# Patient Record
Sex: FEMALE | Race: WHITE | NOT HISPANIC OR LATINO | URBAN - NONMETROPOLITAN AREA
[De-identification: names, ages, dates, MRNs, and addresses within clinical notes are randomized per-mention and may not be internally consistent; named-entity substitution may affect disease eponyms.]

---

## 2019-08-19 ENCOUNTER — OFFICE VISIT (OUTPATIENT)
Dept: URBAN - NONMETROPOLITAN AREA CLINIC 18 | Facility: CLINIC | Age: 59
End: 2019-08-19
Payer: COMMERCIAL

## 2019-08-19 PROCEDURE — 92014 COMPRE OPH EXAM EST PT 1/>: CPT | Performed by: OPTOMETRIST

## 2019-08-19 ASSESSMENT — VISUAL ACUITY
OD: 20/20
OS: 20/20

## 2019-08-19 ASSESSMENT — INTRAOCULAR PRESSURE
OS: 22
OD: 22

## 2019-08-19 NOTE — IMPRESSION/PLAN
Impression: Ocular hypertension, bilateral: H40.053. Plan: Ed pt on potential for glaucoma and risk of vision loss if left undetected. Pt reports family hx of glaucoma related blindness. VF 24-2 clear OU. OCT ON OD small inf RNFL defect and no thinning OS. Thick Pachy OU. No treatment needed at this time.  Will continue

## 2019-08-22 ENCOUNTER — TESTING ONLY (OUTPATIENT)
Dept: URBAN - NONMETROPOLITAN AREA CLINIC 18 | Facility: CLINIC | Age: 59
End: 2019-08-22
Payer: MEDICAID

## 2019-08-22 PROCEDURE — 76514 ECHO EXAM OF EYE THICKNESS: CPT | Performed by: OPTOMETRIST

## 2019-08-22 PROCEDURE — 92083 EXTENDED VISUAL FIELD XM: CPT | Performed by: OPTOMETRIST

## 2019-08-22 PROCEDURE — 92133 CPTRZD OPH DX IMG PST SGM ON: CPT | Performed by: OPTOMETRIST

## 2019-09-23 ENCOUNTER — OFFICE VISIT (OUTPATIENT)
Dept: URBAN - NONMETROPOLITAN AREA CLINIC 18 | Facility: CLINIC | Age: 59
End: 2019-09-23
Payer: MEDICAID

## 2019-09-23 PROCEDURE — 99213 OFFICE O/P EST LOW 20 MIN: CPT | Performed by: OPTOMETRIST

## 2019-09-23 ASSESSMENT — INTRAOCULAR PRESSURE
OS: 23
OD: 23

## 2019-09-23 NOTE — IMPRESSION/PLAN
Impression: Ocular hypertension, bilateral: H40.053. Plan: Ed pt on potential for glaucoma and risk of vision loss if left undetected. Pt reports family hx of glaucoma related blindness. VF 24-2 clear OU. OCT ON OD: small inf RNFL defect; OS: no thinning. Thick Pachy OU. No treatment needed at this time.  Will continue to monitor yearly with repeat OCT ON and complete exam.

## 2020-09-28 ENCOUNTER — OFFICE VISIT (OUTPATIENT)
Dept: URBAN - NONMETROPOLITAN AREA CLINIC 18 | Facility: CLINIC | Age: 60
End: 2020-09-28
Payer: MEDICAID

## 2020-09-28 DIAGNOSIS — H02.831 DERMATOCHALASIS OF RIGHT UPPER EYELID: ICD-10-CM

## 2020-09-28 DIAGNOSIS — H52.223 REGULAR ASTIGMATISM, BILATERAL: ICD-10-CM

## 2020-09-28 PROCEDURE — 99214 OFFICE O/P EST MOD 30 MIN: CPT | Performed by: OPTOMETRIST

## 2020-09-28 PROCEDURE — 92133 CPTRZD OPH DX IMG PST SGM ON: CPT | Performed by: OPTOMETRIST

## 2020-09-28 ASSESSMENT — INTRAOCULAR PRESSURE
OD: 20
OS: 20

## 2020-09-28 NOTE — IMPRESSION/PLAN
Impression: Ocular hypertension, bilateral: H40.053. Plan: Ed pt on potential for glaucoma and risk of vision loss if left undetected. Pt reports family hx of glaucoma related blindness. VF 24-2 clear OU. OCT ON OD: small inf RNFL defect, stable; OS: no thinning, stable. Thick Pachy OU. No treatment needed at this time.  Will continue to monitor yearly with repeat OCT ON and complete exam.

## 2020-10-26 ENCOUNTER — OFFICE VISIT (OUTPATIENT)
Dept: URBAN - NONMETROPOLITAN AREA CLINIC 18 | Facility: CLINIC | Age: 60
End: 2020-10-26
Payer: COMMERCIAL

## 2020-10-26 DIAGNOSIS — H25.13 AGE-RELATED NUCLEAR CATARACT, BILATERAL: ICD-10-CM

## 2020-10-26 DIAGNOSIS — H04.123 DRY EYE SYNDROME OF BILATERAL LACRIMAL GLANDS: Chronic | ICD-10-CM

## 2020-10-26 DIAGNOSIS — H40.053 OCULAR HYPERTENSION, BILATERAL: Chronic | ICD-10-CM

## 2020-10-26 PROCEDURE — 92014 COMPRE OPH EXAM EST PT 1/>: CPT | Performed by: OPTOMETRIST

## 2020-10-26 ASSESSMENT — INTRAOCULAR PRESSURE
OS: 19
OD: 19

## 2020-10-26 ASSESSMENT — VISUAL ACUITY
OD: 20/20
OS: 20/20

## 2020-10-26 NOTE — IMPRESSION/PLAN
Impression: Ocular hypertension, bilateral: H40.053. Plan: Ed pt on potential for glaucoma and risk of vision loss if left undetected. Pt reports family hx of glaucoma related blindness. H/O VF 24-2 clear OU. H/O OCT ON OD: small inf RNFL defect, stable; OS: no thinning, stable. Thick Pachy OU. No treatment needed at this time.  Will continue to monitor yearly with repeat OCT ON and complete exam.

## 2021-11-01 ENCOUNTER — OFFICE VISIT (OUTPATIENT)
Dept: URBAN - NONMETROPOLITAN AREA CLINIC 18 | Facility: CLINIC | Age: 61
End: 2021-11-01
Payer: MEDICAID

## 2021-11-01 DIAGNOSIS — H25.813 COMBINED FORMS OF AGE-RELATED CATARACT, BILATERAL: Primary | ICD-10-CM

## 2021-11-01 DIAGNOSIS — H02.834 DERMATOCHALASIS OF LEFT UPPER EYELID: ICD-10-CM

## 2021-11-01 PROCEDURE — 99214 OFFICE O/P EST MOD 30 MIN: CPT | Performed by: OPTOMETRIST

## 2021-11-01 ASSESSMENT — VISUAL ACUITY
OS: 20/30
OD: 20/30

## 2021-11-01 ASSESSMENT — INTRAOCULAR PRESSURE
OD: 22
OS: 20

## 2021-11-01 NOTE — IMPRESSION/PLAN
Impression: Combined forms of age-related cataract, bilateral: H25.813. Plan: Cataracts account for the patient's complaints. Discussed all risks, benefits, procedures and recovery. Patient understands changing glasses will not improve vision. Patient desires to have surgery, recommend phacoemulsification with intraocular lens OS then OD. RL2. Pt not interested in advanced technology and understand she will need glasses to correct astigmatism and near vision after surgery. Dexycu/generic drops discussed. Pt to RTC this week for non-billable OCT mac and glare testing to proceed.

## 2021-11-01 NOTE — IMPRESSION/PLAN
Impression: Ocular hypertension, bilateral: H40.053. Plan: Ed pt on potential for glaucoma and risk of vision loss if left undetected. Pt reports family hx of glaucoma related blindness. H/O VF 24-2 clear OU. H/O OCT ON OD: small inf RNFL defect, stable; OS: no thinning, stable. Thick Pachy OU. No treatment needed at this time.  Will monitor with OCT RNFL after cataract surgery

## 2022-01-07 ENCOUNTER — OFFICE VISIT (OUTPATIENT)
Dept: URBAN - METROPOLITAN AREA CLINIC 88 | Facility: CLINIC | Age: 62
End: 2022-01-07
Payer: MEDICAID

## 2022-01-07 DIAGNOSIS — H35.3190 NONEXUDATIVE AGE-RELATED MACULAR DEGENERATION: ICD-10-CM

## 2022-01-07 DIAGNOSIS — H02.839 DERMATOCHALASIS OF UNSPECIFIED EYE, UNSPECIFIED EYELID: ICD-10-CM

## 2022-01-07 PROCEDURE — 92134 CPTRZ OPH DX IMG PST SGM RTA: CPT | Performed by: OPHTHALMOLOGY

## 2022-01-07 PROCEDURE — 92004 COMPRE OPH EXAM NEW PT 1/>: CPT | Performed by: OPHTHALMOLOGY

## 2022-01-07 ASSESSMENT — INTRAOCULAR PRESSURE
OS: 20
OD: 21

## 2022-01-07 ASSESSMENT — KERATOMETRY
OD: 43.50
OS: 43.25

## 2022-01-07 ASSESSMENT — VISUAL ACUITY
OD: 20/40
OS: 20/40

## 2022-01-07 NOTE — IMPRESSION/PLAN
Impression: Dermatochalasis of unspecified eye, unspecified eyelid: H02.839. Plan: Advised patient of condition. No treatment is required at this time.

## 2022-01-07 NOTE — IMPRESSION/PLAN
Impression: Age-related nuclear cataract, bilateral: H25.13. Plan: Cataracts account for the patient's complaints. Discussed all risks, benefits, procedures and recovery. Patient understands changing glasses will not improve vision. Patient desires to have surgery, recommend phacoemulsification with intraocular lens. Surgical risks and benefits were discussed, explained and to patient. RL2 Schedule Cataract sx OS first/then OD. Final post operative refractive decision will be made by Operative Surgeon. Pred-Moxi-Nepafenac/generic drops/dexycu Post Op care in [place].

## 2022-01-07 NOTE — IMPRESSION/PLAN
Impression: Nonexudative age-related macular degeneration: H35.3190. Plan: Macular degeneration, dry type with stable vision. Will continue to monitor vision and the patient has been instructed to call with any vision changes. Use of vitamins has shown to improve the effects of ARMD.  Counseling about the benefits and/or risks of the Age-Related Eye Disease Study (AREDS) formulation for preventing progression of age-related macular degeneration (AMD) was provided to the patient and/or caregiver(s). negative... Medications/Labs/Imaging Studies

## 2022-01-07 NOTE — IMPRESSION/PLAN
Impression: Ocular hypertension, bilateral: H40.053. Plan: Reviewed OCT ON and VF. Ocular hypertension, intraocular pressure is slightly elevated. Will not start medication and will observe. I was present for and supervised the key/critical aspects of the procedures performed during the care of the patient.

## 2022-02-08 ENCOUNTER — SURGERY (OUTPATIENT)
Dept: URBAN - METROPOLITAN AREA SURGERY 56 | Facility: SURGERY | Age: 62
End: 2022-02-08
Payer: MEDICAID

## 2022-02-08 PROCEDURE — 66984 XCAPSL CTRC RMVL W/O ECP: CPT | Performed by: OPHTHALMOLOGY

## 2022-02-09 ENCOUNTER — POST-OPERATIVE VISIT (OUTPATIENT)
Dept: URBAN - NONMETROPOLITAN AREA CLINIC 18 | Facility: CLINIC | Age: 62
End: 2022-02-09
Payer: MEDICAID

## 2022-02-09 PROCEDURE — 99024 POSTOP FOLLOW-UP VISIT: CPT | Performed by: OPTOMETRIST

## 2022-02-09 ASSESSMENT — INTRAOCULAR PRESSURE: OS: 20

## 2022-02-09 NOTE — IMPRESSION/PLAN
Impression: S/P Cataract Extraction by phacoemulsification with IOL placement OS - 1 Day. Encounter for surgical aftercare following surgery on a sense organ  Z48.810. Post operative instructions reviewed - Plan: Advised no heavy lifting or strenuous activity for at
least one week and no swimming for at least three weeks. Use eye shield at night for one week. Patient advised to call immediately for any
problems including, but not limited to, pain, redness, increase in floaters, flashing
lights, changes in peripheral vision, decreased vision, and/or any other problems
or concerns. Patient stated an understanding of all the instructions. Follow-up in
approximately one week / prn --Advised patient to use artificial tears for comfort.

## 2022-02-15 ENCOUNTER — POST-OPERATIVE VISIT (OUTPATIENT)
Dept: URBAN - NONMETROPOLITAN AREA CLINIC 18 | Facility: CLINIC | Age: 62
End: 2022-02-15
Payer: MEDICAID

## 2022-02-15 DIAGNOSIS — Z48.810 ENCOUNTER FOR SURGICAL AFTERCARE FOLLOWING SURGERY ON A SENSE ORGAN: Primary | ICD-10-CM

## 2022-02-15 PROCEDURE — 99024 POSTOP FOLLOW-UP VISIT: CPT | Performed by: OPTOMETRIST

## 2022-02-15 ASSESSMENT — VISUAL ACUITY
OD: 20/50
OS: 20/25

## 2022-02-15 ASSESSMENT — INTRAOCULAR PRESSURE
OS: 17
OD: 17

## 2022-02-15 NOTE — IMPRESSION/PLAN
Impression: S/P Cataract Extraction by phacoemulsification with IOL placement OS - 7 Days. Encounter for surgical aftercare following surgery on a sense organ  Z48.810. Post operative instructions reviewed - Plan: Patient advised to call immediately for any problems including, but not limited to, pain, redness, increase in floaters, flashing lights, changes in peripheral vision, decreased vision, and/or any other problems or concerns. Patient stated an understanding of all the instructions. --Advised patient to use artificial tears for comfort.

## 2022-02-22 ENCOUNTER — SURGERY (OUTPATIENT)
Dept: URBAN - METROPOLITAN AREA SURGERY 56 | Facility: SURGERY | Age: 62
End: 2022-02-22
Payer: MEDICAID

## 2022-02-22 PROCEDURE — 66984 XCAPSL CTRC RMVL W/O ECP: CPT | Performed by: OPHTHALMOLOGY

## 2022-02-23 ENCOUNTER — POST-OPERATIVE VISIT (OUTPATIENT)
Dept: URBAN - NONMETROPOLITAN AREA CLINIC 18 | Facility: CLINIC | Age: 62
End: 2022-02-23
Payer: MEDICAID

## 2022-02-23 DIAGNOSIS — Z96.1 PRESENCE OF INTRAOCULAR LENS: Primary | ICD-10-CM

## 2022-02-23 PROCEDURE — 99024 POSTOP FOLLOW-UP VISIT: CPT | Performed by: OPTOMETRIST

## 2022-02-23 ASSESSMENT — INTRAOCULAR PRESSURE: OD: 23

## 2022-02-23 NOTE — IMPRESSION/PLAN
Impression: S/P Cataract Extraction by phacoemulsification with IOL placement OD - 1 Day. Presence of intraocular lens  Z96.1. Plan: Advised no heavy lifting or strenuous activity for at
least one week and no swimming for at least three weeks. Use eye shield at night for one week. Patient advised to call immediately for any
problems including, but not limited to, pain, redness, increase in floaters, flashing
lights, changes in peripheral vision, decreased vision, and/or any other problems
or concerns. Patient stated an understanding of all the instructions. Follow-up in
approximately one week / prn --Advised patient to use artificial tears for comfort.

## 2022-03-02 ENCOUNTER — POST-OPERATIVE VISIT (OUTPATIENT)
Dept: URBAN - NONMETROPOLITAN AREA CLINIC 18 | Facility: CLINIC | Age: 62
End: 2022-03-02
Payer: MEDICAID

## 2022-03-02 PROCEDURE — 99024 POSTOP FOLLOW-UP VISIT: CPT | Performed by: OPTOMETRIST

## 2022-03-02 ASSESSMENT — INTRAOCULAR PRESSURE
OS: 17
OD: 17

## 2022-03-02 ASSESSMENT — VISUAL ACUITY
OS: 20/20
OD: 20/20

## 2022-03-16 ENCOUNTER — POST-OPERATIVE VISIT (OUTPATIENT)
Dept: URBAN - NONMETROPOLITAN AREA CLINIC 18 | Facility: CLINIC | Age: 62
End: 2022-03-16
Payer: MEDICAID

## 2022-03-16 PROCEDURE — 99024 POSTOP FOLLOW-UP VISIT: CPT | Performed by: OPTOMETRIST

## 2022-03-16 ASSESSMENT — VISUAL ACUITY
OS: 20/20-1
OD: 20/20

## 2022-03-16 ASSESSMENT — INTRAOCULAR PRESSURE
OD: 20
OS: 19

## 2022-03-16 NOTE — IMPRESSION/PLAN
Impression: S/P Cataract Extraction by phacoemulsification with IOL placement OD - 22 Days. Presence of intraocular lens  Z96.1. Post operative instructions reviewed - Plan: Glasses rx released today. Patient advised to call immediately for any problems including, but not limited to: pain, redness, increase in floaters, flashing lights, changes in peripheral vision, decreased vision, and/or any other problems or concerns. Patient stated an understanding of all the instructions. Follow-up in approximately three/four months / prn for additional monitoring. --Advised patient to use artificial tears for comfort.

## 2022-08-03 ENCOUNTER — OFFICE VISIT (OUTPATIENT)
Dept: URBAN - NONMETROPOLITAN AREA CLINIC 18 | Facility: CLINIC | Age: 62
End: 2022-08-03
Payer: MEDICAID

## 2022-08-03 DIAGNOSIS — Z96.1 PRESENCE OF PSEUDOPHAKIA: ICD-10-CM

## 2022-08-03 DIAGNOSIS — H35.3131 NONEXUDATIVE AGE-RELATED MACULAR DEGENERATION, BILATERAL, EARLY DRY STAGE: ICD-10-CM

## 2022-08-03 DIAGNOSIS — H26.493 OTHER SECONDARY CATARACT, BILATERAL: Primary | ICD-10-CM

## 2022-08-03 DIAGNOSIS — H33.311 OPERCULUM OF RETINA W/O DETACHMENT OF RIGHT EYE: ICD-10-CM

## 2022-08-03 PROCEDURE — 99214 OFFICE O/P EST MOD 30 MIN: CPT | Performed by: OPTOMETRIST

## 2022-08-03 PROCEDURE — 92134 CPTRZ OPH DX IMG PST SGM RTA: CPT | Performed by: OPTOMETRIST

## 2022-08-03 ASSESSMENT — INTRAOCULAR PRESSURE
OD: 17
OS: 16

## 2022-08-03 NOTE — IMPRESSION/PLAN
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 Plan: Discussed status with patient. Discussed status. No symptoms experienced. All signs/symptoms and risks of retinal detachment and tears were discussed in detail. Patient instructed to call office immediately if any symptoms noted.

## 2023-08-04 ENCOUNTER — OFFICE VISIT (OUTPATIENT)
Dept: URBAN - NONMETROPOLITAN AREA CLINIC 18 | Facility: CLINIC | Age: 63
End: 2023-08-04
Payer: MEDICAID

## 2023-08-04 DIAGNOSIS — H16.143 PUNCTATE KERATITIS, BILATERAL: ICD-10-CM

## 2023-08-04 DIAGNOSIS — Z96.1 PRESENCE OF PSEUDOPHAKIA: ICD-10-CM

## 2023-08-04 DIAGNOSIS — H35.3131 NONEXUDATIVE AGE-RELATED MACULAR DEGENERATION, BILATERAL, EARLY DRY STAGE: ICD-10-CM

## 2023-08-04 DIAGNOSIS — H43.813 VITREOUS DEGENERATION, BILATERAL: ICD-10-CM

## 2023-08-04 DIAGNOSIS — H26.493 OTHER SECONDARY CATARACT, BILATERAL: Primary | ICD-10-CM

## 2023-08-04 DIAGNOSIS — H33.311 OPERCULUM OF RETINA W/O DETACHMENT OF RIGHT EYE: ICD-10-CM

## 2023-08-04 DIAGNOSIS — H52.223 REGULAR ASTIGMATISM, BILATERAL: ICD-10-CM

## 2023-08-04 PROCEDURE — 99213 OFFICE O/P EST LOW 20 MIN: CPT | Performed by: OPTOMETRIST

## 2023-08-04 ASSESSMENT — VISUAL ACUITY
OD: 20/25
OS: 20/25

## 2023-08-04 ASSESSMENT — INTRAOCULAR PRESSURE
OS: 15
OD: 15

## 2024-09-17 ENCOUNTER — OFFICE VISIT (OUTPATIENT)
Dept: URBAN - NONMETROPOLITAN AREA CLINIC 18 | Facility: CLINIC | Age: 64
End: 2024-09-17
Payer: MEDICAID

## 2024-09-17 DIAGNOSIS — H35.3131 NONEXUDATIVE AGE-RELATED MACULAR DEGENERATION, BILATERAL, EARLY DRY STAGE: ICD-10-CM

## 2024-09-17 DIAGNOSIS — H43.813 VITREOUS DEGENERATION, BILATERAL: ICD-10-CM

## 2024-09-17 DIAGNOSIS — H16.143 PUNCTATE KERATITIS, BILATERAL: ICD-10-CM

## 2024-09-17 DIAGNOSIS — H18.523 EPITHELIAL (JUVENILE) CORNEAL DYSTROPHY, BILATERAL: ICD-10-CM

## 2024-09-17 DIAGNOSIS — H26.493 OTHER SECONDARY CATARACT, BILATERAL: Primary | ICD-10-CM

## 2024-09-17 DIAGNOSIS — H35.372 PUCKERING OF MACULA, LEFT EYE: ICD-10-CM

## 2024-09-17 DIAGNOSIS — Z96.1 PRESENCE OF PSEUDOPHAKIA: ICD-10-CM

## 2024-09-17 DIAGNOSIS — H52.223 REGULAR ASTIGMATISM, BILATERAL: ICD-10-CM

## 2024-09-17 PROCEDURE — 92014 COMPRE OPH EXAM EST PT 1/>: CPT | Performed by: OPTOMETRIST

## 2024-09-17 PROCEDURE — 92134 CPTRZ OPH DX IMG PST SGM RTA: CPT | Performed by: OPTOMETRIST

## 2024-09-17 ASSESSMENT — INTRAOCULAR PRESSURE
OS: 15
OD: 15